# Patient Record
Sex: FEMALE | Race: WHITE | NOT HISPANIC OR LATINO | Employment: UNEMPLOYED | URBAN - METROPOLITAN AREA
[De-identification: names, ages, dates, MRNs, and addresses within clinical notes are randomized per-mention and may not be internally consistent; named-entity substitution may affect disease eponyms.]

---

## 2019-01-10 ENCOUNTER — OFFICE VISIT (OUTPATIENT)
Dept: OBSTETRICS AND GYNECOLOGY | Facility: CLINIC | Age: 29
End: 2019-01-10

## 2019-01-10 VITALS
WEIGHT: 136 LBS | HEART RATE: 77 BPM | HEIGHT: 67 IN | BODY MASS INDEX: 21.35 KG/M2 | SYSTOLIC BLOOD PRESSURE: 112 MMHG | DIASTOLIC BLOOD PRESSURE: 72 MMHG

## 2019-01-10 DIAGNOSIS — Z12.4 SCREENING FOR CERVICAL CANCER: ICD-10-CM

## 2019-01-10 DIAGNOSIS — Z80.3 FAMILY HISTORY OF BREAST CANCER: ICD-10-CM

## 2019-01-10 DIAGNOSIS — Z71.6 ENCOUNTER FOR TOBACCO USE CESSATION COUNSELING: ICD-10-CM

## 2019-01-10 DIAGNOSIS — T83.32XA MALPOSITIONED INTRAUTERINE DEVICE (IUD), INITIAL ENCOUNTER: ICD-10-CM

## 2019-01-10 DIAGNOSIS — Z11.4 SCREENING FOR HIV (HUMAN IMMUNODEFICIENCY VIRUS): ICD-10-CM

## 2019-01-10 DIAGNOSIS — N76.0 BACTERIAL VAGINOSIS: ICD-10-CM

## 2019-01-10 DIAGNOSIS — B96.89 BACTERIAL VAGINOSIS: ICD-10-CM

## 2019-01-10 DIAGNOSIS — Z01.419 ENCOUNTER FOR GYNECOLOGICAL EXAMINATION: Primary | ICD-10-CM

## 2019-01-10 DIAGNOSIS — Z11.3 SCREEN FOR STD (SEXUALLY TRANSMITTED DISEASE): ICD-10-CM

## 2019-01-10 DIAGNOSIS — N89.8 VAGINAL DISCHARGE: ICD-10-CM

## 2019-01-10 PROCEDURE — 99385 PREV VISIT NEW AGE 18-39: CPT | Performed by: OBSTETRICS & GYNECOLOGY

## 2019-01-10 PROCEDURE — 99213 OFFICE O/P EST LOW 20 MIN: CPT | Performed by: OBSTETRICS & GYNECOLOGY

## 2019-01-10 PROCEDURE — 99406 BEHAV CHNG SMOKING 3-10 MIN: CPT | Performed by: OBSTETRICS & GYNECOLOGY

## 2019-01-10 RX ORDER — BUPRENORPHINE HYDROCHLORIDE AND NALOXONE HYDROCHLORIDE DIHYDRATE 8; 2 MG/1; MG/1
TABLET SUBLINGUAL
COMMUNITY
Start: 2019-01-09

## 2019-01-10 RX ORDER — METRONIDAZOLE 500 MG/1
500 TABLET ORAL 2 TIMES DAILY
Qty: 14 TABLET | Refills: 0 | Status: SHIPPED | OUTPATIENT
Start: 2019-01-10 | End: 2019-01-17

## 2019-01-10 NOTE — PROGRESS NOTES
Subjective   Lorri Mccann is a 28 y.o. female.   CC: pt here for annual and std check.   History of Present Illness   Patient is here for her annual exam.  She actually is more concerned about recent findings of vaginal discharge and vaginal odor.  Patient has not been seen since after the delivery of her son nearly 5 years ago.  Her last Pap smear was 2013 and was normal.  She had a Mirena IUD inserted in 2014.  She reports that recently she had broken up with her significant other, and she had been sexually active with 2 different partners.  She reports that she recently learned that one of his partners assist in active IV drug abuser.  She states and since that time she has noticed a vaginal discharge and vaginal odor.  She also states that she had recently wanted to check on her IUD strings but she thought that might be the cause of the odor.  She states that she felt which she thought was a knot in the IUD strings and she attempted: This.  She states that she had some bleeding afterwards, but she did not remove the IUD.  The patient would be due for an IUD removal in 2019.  She states that she would like to have another IUD inserted at that time if possible.  The patient does have a family history of breast cancer.  She states that she had a maternal aunt was diagnosed with breast cancer in her early 30s.  Patient is a smoker.  She smokes about 1 pack per day.  She is also currently taking Suboxone.  She has been on this for at least 5 years.  She states that she does have plans in the near future to try to wean off of the Suboxone.  She states that she understands it is important to stop smoking, but she would like to get off of the Suboxone first.    Obstetric History       T3      L3     SAB0   TAB0   Ectopic0   Molar0   Multiple0   Live Births0       # Outcome Date GA Lbr Tang/2nd Weight Sex Delivery Anes PTL Lv   3 Term  40w5d  3969 g (8 lb 12 oz) M Vag-Spont      2 Term   "40w4d  4508 g (9 lb 15 oz) M Vag-Spont      1 Term  40w4d  3232 g (7 lb 2 oz) F Vag-Spont           History reviewed. No pertinent past medical history.     History reviewed. No pertinent surgical history.     Family History   Problem Relation Age of Onset   • Lung cancer Paternal Grandfather    Maternal aunt - breast Ca - early 30s    Social History     Tobacco Use   • Smoking status: Current Every Day Smoker     Packs/day: 1.00     Types: Cigarettes   • Smokeless tobacco: Never Used   Substance Use Topics   • Alcohol use: No     Frequency: Never   • Drug use: No     Current Outpatient Medications on File Prior to Visit   Medication Sig   • levonorgestrel (MIRENA, 52 MG,) 20 MCG/24HR IUD 1 each by Intrauterine route 1 (One) Time.   • buprenorphine-naloxone (SUBOXONE) 8-2 MG per SL tablet      No current facility-administered medications on file prior to visit.        No Known Allergies    Review of Systems  General: No fever or chills  Constitutional: No weight loss or gain, no hair loss  HENT: No headache, no hearing loss, no tinnitus  Eyes: normal vision, no eye pain  Lungs: No cough, no shortness of breath  Heart: No chest pain, no palpitations  Abdomen: No nausea, vomiting, constipation or diarrhea  : No dysuria, no hematuria  Skin: No rashes  Lymph: No swelling  Neuro: No parathesia, no weakness  Psych: Normal though content, no hallucinations, no SI/HI    Objective   Physical Exam  Vitals:    01/10/19 1003   BP: 112/72   Pulse: 77   Weight: 61.7 kg (136 lb)   Height: 170.2 cm (67\")     Exam performed in the presence of a female chaperone  Patient has provided verbal consent to proceed with exam.    Gen: No acute distress, awake and oriented times three  HENT: Normocephalic, atraumatic, Moist mucous membranes  Eyes: PERRLA, EOMI  Neck: Supple, normal range of motion, no thyromegaly  Lungs: Normal work of breathing, lungs clear bilaterally, no crackles/wheezes  Heart: Regular rate and rhythm, no " murmurs  Breast: Symmetrical. No skin changes or nipple retractions. No lumps or masses bilaterally. No tenderness bilaterally.  Abdomen: soft, nontender, no masses or hernia, no hepatosplenomegaly, non distended, normoactive bowel sounds  Pelvic: Exam performed in the presence of a female chaperone  Patient has provided verbal consent to proceed with exam.  Normal external female genitalia, no lesions  Urethra: Normal meatus, no caruncle  Bladder: nontender  Vagina: No blood; moderate amount of white frothy discharge noted  Cervix: No cervical motion tenderness, no lesions, mildly friable, the IUD strings seem longer than I would expect, they are coiled up but may be as long as 4-5 centimeters.  There is also a clump of what appears to be vaginal mucus/discharge within this coiled strings.  Uterus: Retroverted, normal size and shape, nontender  Adnexa: No masses or tenderness  External anal exam: Normal appearance, no lesions or hemorrhoids  Rectal: Deferred  Skin: Warm and dry, no rashes  Psych: Good judgement and insight, normal affect and mood  Neuro: CN 2-12 intact, no gross deficits    Office based wet prep: Many clue cells, no obvious trichomonads, no obvious yeast.  Moderate white blood cells, scant red blood cells    Assessment/Plan   Diagnoses and all orders for this visit:    Encounter for gynecological examination  Pap smear and STD test performed today.  Discussed the importance of regular yearly gynecologic exams.  Routine self breast exams encouraged.  Patient has a family history of breast cancer in a maternal aunt in her early 30s.  I would recommend yearly mammograms at this age because of her family history of breast cancer.  We can get her scheduled for a screening mammogram and we discussed the importance of yearly mammograms.  Screening for cervical cancer  -     IGP,CtNgTv,rfx Apt HPV All    Screen for STD (sexually transmitted disease)  -     IGP,CtNgTv,rfx Apt HPV All  -     HCV Antibody Rfx  To Qnt PCR  -     Hepatitis B Surface Antigen  -     RPR    Encounter for tobacco use cessation counseling  I advised Lorri of the risks of continuing to use tobacco, and I provided her with tobacco cessation educational materials in the After Visit Summary.     During this visit, I spent greater than 3 minutes counseling the patient regarding tobacco cessation.  Screening for HIV (human immunodeficiency virus)  -     HIV-1 / O / 2 Ag / Antibody 4th Generation    Vaginal discharge  -     NuSwab BV & Candida - Swab, Vagina  Findings consistent with at least bacterial vaginosis, possibly concerning for Trichomonas/gonorrhea/Chlamydia.  Cultures were sent and we will obtain results and treat based on exam findings.  We can start metronidazole empirically today.  Patient desires IUD removal and reinsertion, but I would like for her to wait until we have gotten his culture results back.  Bacterial vaginosis  -     metroNIDAZOLE (FLAGYL) 500 MG tablet; Take 1 tablet by mouth 2 (Two) Times a Day for 7 days.    Malpositioned intrauterine device (IUD), initial encounter  Strings are longer than I would expect, and I suspect that the IUD may be low in the uterus.  Since the patient is close to the time of having to have IUD removal and reinsertion anyway, we can discuss ahead and proceed with movable of this device and placement of a new Mirena IUD.  The patient agrees with this plan  Family history of breast cancer    Recommend yearly screening mammograms

## 2019-01-11 ENCOUNTER — TELEPHONE (OUTPATIENT)
Dept: OBSTETRICS AND GYNECOLOGY | Facility: CLINIC | Age: 29
End: 2019-01-11

## 2019-01-11 LAB
HBV SURFACE AG SERPL QL IA: NEGATIVE
HCV AB S/CO SERPL IA: <0.1 S/CO RATIO (ref 0–0.9)
HCV AB SERPL QL IA: NORMAL
HIV 1+2 AB+HIV1 P24 AG SERPL QL IA: NON REACTIVE
RPR SER QL: NORMAL

## 2019-01-11 NOTE — TELEPHONE ENCOUNTER
Pt aware. SHERWIN      ----- Message from Cedric Pang MD sent at 1/11/2019  9:58 AM EST -----  Notify the patient that her STD blood work was normal.  I am waiting on the Pap smear and culture results

## 2019-01-14 LAB
A VAGINAE DNA VAG QL NAA+PROBE: ABNORMAL SCORE
BVAB2 DNA VAG QL NAA+PROBE: ABNORMAL SCORE
C ALBICANS DNA VAG QL NAA+PROBE: NEGATIVE
C GLABRATA DNA VAG QL NAA+PROBE: NEGATIVE
C TRACH RRNA CVX QL NAA+PROBE: NEGATIVE
CONV .: NORMAL
CYTOLOGIST CVX/VAG CYTO: NORMAL
CYTOLOGY CVX/VAG DOC THIN PREP: NORMAL
DX ICD CODE: NORMAL
HIV 1 & 2 AB SER-IMP: NORMAL
MEGA1 DNA VAG QL NAA+PROBE: ABNORMAL SCORE
N GONORRHOEA RRNA CVX QL NAA+PROBE: NEGATIVE
OTHER STN SPEC: NORMAL
PATH REPORT.FINAL DX SPEC: NORMAL
STAT OF ADQ CVX/VAG CYTO-IMP: NORMAL
T VAGINALIS RRNA SPEC QL NAA+PROBE: NEGATIVE

## 2019-01-16 ENCOUNTER — TELEPHONE (OUTPATIENT)
Dept: OBSTETRICS AND GYNECOLOGY | Facility: CLINIC | Age: 29
End: 2019-01-16

## 2019-01-16 NOTE — TELEPHONE ENCOUNTER
Pt aware. SHERWIN        ----- Message from Cedric Pang MD sent at 1/15/2019  2:38 PM EST -----  Notify the patient that her Pap smear was normal and her gonorrhea and chlamydia tests were negative.  Her cultures did reveal bacterial vaginosis, but she was treated for this with metronidazole at her last visit

## 2019-03-29 ENCOUNTER — PROCEDURE VISIT (OUTPATIENT)
Dept: OBSTETRICS AND GYNECOLOGY | Facility: CLINIC | Age: 29
End: 2019-03-29

## 2019-03-29 VITALS
HEART RATE: 65 BPM | WEIGHT: 137 LBS | BODY MASS INDEX: 21.5 KG/M2 | SYSTOLIC BLOOD PRESSURE: 102 MMHG | DIASTOLIC BLOOD PRESSURE: 62 MMHG | HEIGHT: 67 IN

## 2019-03-29 DIAGNOSIS — T83.32XD MALPOSITIONED INTRAUTERINE DEVICE (IUD), SUBSEQUENT ENCOUNTER: Primary | ICD-10-CM

## 2019-03-29 DIAGNOSIS — Z30.433 ENCOUNTER FOR IUD REMOVAL AND REINSERTION: ICD-10-CM

## 2019-03-29 PROCEDURE — 58300 INSERT INTRAUTERINE DEVICE: CPT | Performed by: OBSTETRICS & GYNECOLOGY

## 2019-03-29 PROCEDURE — 58301 REMOVE INTRAUTERINE DEVICE: CPT | Performed by: OBSTETRICS & GYNECOLOGY

## 2019-03-29 NOTE — PROGRESS NOTES
Procedure   Procedures   CC: pt here for removal and insertion of Mirena.       Procedure: Mirena Intrauterine device removal and reinsertion  Preoperative diagnosis: 28-year-old  3 para 3 here for IUD removal and reinsertion  2.  Malposition of the current IUD  Postoperative diagnosis: Same  Indications: Patient is here for IUD removal and reinsertion.  She currently has a Mirena IUD, but the strings are very long, and I feel it is likely that her IUD is low in the uterus.  It has actually been 5 years since this first IUD was placed, so she is due for removal anyway.  The patient would like to have another Mirena IUD placed.  The risks, benefits, alternatives, and side effects were discussed with the patient at length.  She wishes to proceed.  Anesthesia: None  Pathology: None  Estimated blood loss: Less than 5 mL  Complications: None    Procedure in detail:  Patient placed in lithotomy position in stirrups.  A bimanual exam was performed.  The uterus is anteverted, normal size, midposition.  Mineral speculum placed into vagina. Cervix was visualized. IUD strings were seen at the external os. Strings were grasped with a ring forceps and the IUD was easily removed with slow, steady, gentle tension. IUD was removed intact and discarded. No complications. The cervix was prepped with Betadine. The anterior lip was grasped with a single-tooth tenaculum. The endometrial cavity was then sounded to 7.5 cm without use of a dilator. Gloves were then exchanged for sterile gloves. This sealed Mirena package was opened and the Mirena was removed in a sterile fashion.  The upper edge of the depth setting the flange was set at a uterine sound measured. The  was then carefully advanced to the cervical canal into the uterus to the level of the fundus. This was then backed off about 1.5-2 cm to allow sufficient space for the arms to open. The slider was then retracted about 1 cm and deployed the device. The device  was then gently advanced to the fundus. The Mirena was then released by pulling the slider down all the way. The  was removed carefully from the uterus. The threads were then cut leaving 2-3 cm visible outside of the cervix.  The single-tooth tenaculum was removed from the anterior lip. Silver nitrate was applied to the tenaculum sites. Good hemostasis was noted. All other instruments were removed from the vagina. There were no complications, and the patient tolerated the procedure well with a minimal amount of discomfort. The patient was counseled about the need to return in 4 weeks for string check. She was counseled about the need to use a backup method of contraception such as condoms until her post insertion exam was performed. The patient verbalized understanding that the Mirena will need to be removed/replaced after 5 years. The patient is counseled to contact us if she has any significant or increasing bleeding, pain, fever, chills, or other concerns. She is instructed to see a doctor right away if she believes that she may be pregnant at any time with the Mirena in place.

## 2019-04-26 ENCOUNTER — OFFICE VISIT (OUTPATIENT)
Dept: OBSTETRICS AND GYNECOLOGY | Facility: CLINIC | Age: 29
End: 2019-04-26

## 2019-04-26 VITALS
SYSTOLIC BLOOD PRESSURE: 121 MMHG | HEIGHT: 67 IN | WEIGHT: 137 LBS | BODY MASS INDEX: 21.5 KG/M2 | HEART RATE: 82 BPM | DIASTOLIC BLOOD PRESSURE: 67 MMHG

## 2019-04-26 DIAGNOSIS — N89.8 VAGINAL DISCHARGE: ICD-10-CM

## 2019-04-26 DIAGNOSIS — B96.89 BACTERIAL VAGINOSIS: ICD-10-CM

## 2019-04-26 DIAGNOSIS — Z30.431 IUD CHECK UP: Primary | ICD-10-CM

## 2019-04-26 DIAGNOSIS — N76.0 BACTERIAL VAGINOSIS: ICD-10-CM

## 2019-04-26 PROBLEM — Z30.433 ENCOUNTER FOR IUD REMOVAL AND REINSERTION: Status: RESOLVED | Noted: 2019-03-29 | Resolved: 2019-04-26

## 2019-04-26 PROBLEM — T83.32XA MALPOSITIONED IUD: Status: RESOLVED | Noted: 2019-01-10 | Resolved: 2019-04-26

## 2019-04-26 PROCEDURE — 99213 OFFICE O/P EST LOW 20 MIN: CPT | Performed by: OBSTETRICS & GYNECOLOGY

## 2019-04-26 NOTE — PROGRESS NOTES
"Subjective   Lorriher ODALIS Mccann is a 28 y.o. female.   Cc: pt here for IUD check.   History of Present Illness   Patient is here for an IUD checkup.  She reports that she has some pain on the day of insertion and some bleeding for a day or 2 after the insertion, but since that time has not had any problems.  She denies pain or irregular bleeding at this time.  She is concerned about vaginal discharge.  She says that she was recently treated with an antibiotic for bacterial vaginosis, but feels that she still has some discharge and is concerned about still persistent bacterial vaginosis.  Otherwise she is doing well.  No fevers or chills.  No nausea or vomiting.    Current Outpatient Medications on File Prior to Visit   Medication Sig   • levonorgestrel (MIRENA) 20 MCG/24HR IUD 1 each by Intrauterine route 1 (One) Time.   • buprenorphine-naloxone (SUBOXONE) 8-2 MG per SL tablet      No current facility-administered medications on file prior to visit.      No Known Allergies    The following portions of the patient's history were reviewed and updated as appropriate: allergies, current medications, past family history, past medical history, past social history, past surgical history and problem list.    Review of Systems  General: No fever or chills  Constitutional: No weight loss or gain, no hair loss  HENT: No headache, no hearing loss, no tinnitus  Eyes: normal vision, no eye pain  Lungs: No cough, no shortness of breath  Heart: No chest pain, no palpitations  Abdomen: No nausea, vomiting, constipation or diarrhea  : No dysuria, no hematuria  Skin: No rashes  Lymph: No swelling  Neuro: No parathesia, no weakness  Psych: Normal though content, no hallucinations, no SI/HI      Objective   Physical Exam  Vitals:    04/26/19 1101   BP: 121/67   Pulse: 82   Weight: 62.1 kg (137 lb)   Height: 170.2 cm (67\")     Gen: No acute distress, awake and oriented times three  Abdomen: soft, nontender, no masses or hernia, no " hepatosplenomegaly, non distended, normoactive bowel sounds  Pelvic: Exam performed in the presence of a female chaperone  Patient has provided verbal consent to proceed with exam.  Normal external female genitalia, no lesions  Urethra: Normal meatus, no caruncle  Bladder: nontender  Vagina: Scant brown/yellowish discharge noted.  No lesions.  No active bleeding.  Cervix: No cervical motion tenderness, no lesions, no active bleeding, nonfriable, tip of IUD string seen just within external os  Bimanual: Deferred  Psych: Good judgement and insight, normal affect and mood        Assessment/Plan   Diagnoses and all orders for this visit:    IUD check up  The IUD strings are seen today.  Reassurance offered to the patient.  Routine follow-up recommended.  She will be due for a annual exam after 1/10/2020.  Vaginal discharge  -     NuSwab VG+ - Swab, Vagina    Some mild discharge noted on exam today.  We will obtain cultures and treat as indicated based on culture results.  She is instructed to call our office for the results if she has not heard them after 1 week.

## 2019-04-30 ENCOUNTER — TELEPHONE (OUTPATIENT)
Dept: OBSTETRICS AND GYNECOLOGY | Facility: CLINIC | Age: 29
End: 2019-04-30

## 2019-04-30 LAB
A VAGINAE DNA VAG QL NAA+PROBE: ABNORMAL SCORE
BVAB2 DNA VAG QL NAA+PROBE: ABNORMAL SCORE
C ALBICANS DNA VAG QL NAA+PROBE: NEGATIVE
C GLABRATA DNA VAG QL NAA+PROBE: NEGATIVE
C TRACH RRNA SPEC QL NAA+PROBE: NEGATIVE
MEGA1 DNA VAG QL NAA+PROBE: ABNORMAL SCORE
N GONORRHOEA RRNA SPEC QL NAA+PROBE: NEGATIVE
T VAGINALIS RRNA SPEC QL NAA+PROBE: NEGATIVE

## 2019-04-30 RX ORDER — METRONIDAZOLE 500 MG/1
500 TABLET ORAL 2 TIMES DAILY
Qty: 14 TABLET | Refills: 0 | Status: SHIPPED | OUTPATIENT
Start: 2019-04-30 | End: 2019-05-07

## 2019-04-30 NOTE — TELEPHONE ENCOUNTER
No answer. SHERWIN      ----- Message from Cedric Pang MD sent at 4/30/2019  4:24 PM EDT -----  Let the patient know that her cultures revealed bacterial vaginosis.  I sent in a prescription for metronidazole.  She should avoid alcohol while taking this medication.

## 2019-05-01 ENCOUNTER — TELEPHONE (OUTPATIENT)
Dept: OBSTETRICS AND GYNECOLOGY | Facility: CLINIC | Age: 29
End: 2019-05-01

## 2019-05-01 NOTE — TELEPHONE ENCOUNTER
Left message to call office. SHERWIN      ----- Message from Cedric Pang MD sent at 4/30/2019  4:24 PM EDT -----  Let the patient know that her cultures revealed bacterial vaginosis.  I sent in a prescription for metronidazole.  She should avoid alcohol while taking this medication.

## 2019-05-02 ENCOUNTER — TELEPHONE (OUTPATIENT)
Dept: OBSTETRICS AND GYNECOLOGY | Facility: CLINIC | Age: 29
End: 2019-05-02

## 2019-05-02 NOTE — TELEPHONE ENCOUNTER
Pt aware. SHERWIN      ----- Message from Cedric Pang MD sent at 4/30/2019  4:24 PM EDT -----  Let the patient know that her cultures revealed bacterial vaginosis.  I sent in a prescription for metronidazole.  She should avoid alcohol while taking this medication.